# Patient Record
Sex: MALE | Race: WHITE
[De-identification: names, ages, dates, MRNs, and addresses within clinical notes are randomized per-mention and may not be internally consistent; named-entity substitution may affect disease eponyms.]

---

## 2017-03-30 ENCOUNTER — HOSPITAL ENCOUNTER (EMERGENCY)
Dept: HOSPITAL 53 - M ED | Age: 47
Discharge: HOME | End: 2017-03-30
Payer: COMMERCIAL

## 2017-03-30 VITALS — BODY MASS INDEX: 35.55 KG/M2 | HEIGHT: 69 IN | WEIGHT: 240 LBS

## 2017-03-30 VITALS — DIASTOLIC BLOOD PRESSURE: 88 MMHG | SYSTOLIC BLOOD PRESSURE: 145 MMHG

## 2017-03-30 DIAGNOSIS — G47.30: ICD-10-CM

## 2017-03-30 DIAGNOSIS — Z88.5: ICD-10-CM

## 2017-03-30 DIAGNOSIS — Z79.899: ICD-10-CM

## 2017-03-30 DIAGNOSIS — Z98.84: ICD-10-CM

## 2017-03-30 DIAGNOSIS — Z88.8: ICD-10-CM

## 2017-03-30 DIAGNOSIS — J06.9: Primary | ICD-10-CM

## 2017-03-30 DIAGNOSIS — I10: ICD-10-CM

## 2017-03-30 DIAGNOSIS — E03.9: ICD-10-CM

## 2017-03-31 NOTE — REP
Clinical:  Cough.

 

Technique:  PA and lateral.

 

Comparison:  10/20/2016.

 

Findings:

Evaluation is somewhat limited by poor inspiratory effort.  There is evidence to

suggest right lower lobe infiltrate/atelectasis.  No obvious effusion.  No

pneumothorax.  Mediastinum and cardiac silhouette normal.  Skeletal structures

intact.

 

Impression:

Right lower lobe infiltrate/atelectasis.

 

 

Signed by

Zachary Sinclair MD 03/31/2017 07:58 A

## 2017-03-31 NOTE — ED PDOC
Post-Departure Follow-Up


formal cxr noted. spoke w ed charge RN. chart reviewed. Tmax 101 at home w 

cough for 1 wk to rx. levaquin 750 mg qd. Aj luna will reach out to pt. and 

review. mlg Lundborg-Gray,Maja MD Mar 31, 2017 11:56

## 2017-08-26 ENCOUNTER — HOSPITAL ENCOUNTER (INPATIENT)
Dept: HOSPITAL 53 - M ED | Age: 47
LOS: 5 days | Discharge: HOME | DRG: 751 | End: 2017-08-31
Attending: STUDENT IN AN ORGANIZED HEALTH CARE EDUCATION/TRAINING PROGRAM | Admitting: PSYCHIATRY & NEUROLOGY
Payer: COMMERCIAL

## 2017-08-26 VITALS — WEIGHT: 204.15 LBS | BODY MASS INDEX: 30.24 KG/M2 | HEIGHT: 69 IN

## 2017-08-26 VITALS — DIASTOLIC BLOOD PRESSURE: 96 MMHG | SYSTOLIC BLOOD PRESSURE: 138 MMHG

## 2017-08-26 VITALS — SYSTOLIC BLOOD PRESSURE: 166 MMHG | DIASTOLIC BLOOD PRESSURE: 116 MMHG

## 2017-08-26 VITALS — SYSTOLIC BLOOD PRESSURE: 188 MMHG | DIASTOLIC BLOOD PRESSURE: 120 MMHG

## 2017-08-26 VITALS — DIASTOLIC BLOOD PRESSURE: 90 MMHG | SYSTOLIC BLOOD PRESSURE: 139 MMHG

## 2017-08-26 DIAGNOSIS — Z88.8: ICD-10-CM

## 2017-08-26 DIAGNOSIS — I10: ICD-10-CM

## 2017-08-26 DIAGNOSIS — F31.81: ICD-10-CM

## 2017-08-26 DIAGNOSIS — R45.851: ICD-10-CM

## 2017-08-26 DIAGNOSIS — F90.9: ICD-10-CM

## 2017-08-26 DIAGNOSIS — F33.2: Primary | ICD-10-CM

## 2017-08-26 DIAGNOSIS — E87.6: ICD-10-CM

## 2017-08-26 DIAGNOSIS — E03.9: ICD-10-CM

## 2017-08-26 DIAGNOSIS — Z91.19: ICD-10-CM

## 2017-08-26 DIAGNOSIS — Z79.899: ICD-10-CM

## 2017-08-26 DIAGNOSIS — F70: ICD-10-CM

## 2017-08-26 LAB
ALBUMIN SERPL BCG-MCNC: 4.7 GM/DL (ref 3.2–5.2)
ALBUMIN/GLOB SERPL: 1.47 {RATIO} (ref 1–1.93)
ALP SERPL-CCNC: 92 U/L (ref 45–117)
ALT SERPL W P-5'-P-CCNC: 17 U/L (ref 12–78)
ANION GAP SERPL CALC-SCNC: 12 MEQ/L (ref 8–16)
AST SERPL-CCNC: 19 U/L (ref 15–37)
BILIRUB CONJ SERPL-MCNC: 0.5 MG/DL (ref 0–0.2)
BILIRUB SERPL-MCNC: 2 MG/DL (ref 0.2–1)
BUN SERPL-MCNC: 14 MG/DL (ref 7–18)
CALCIUM SERPL-MCNC: 9.3 MG/DL (ref 8.5–10.1)
CHLORIDE SERPL-SCNC: 104 MEQ/L (ref 98–107)
CO2 SERPL-SCNC: 27 MEQ/L (ref 21–32)
CREAT SERPL-MCNC: 1.17 MG/DL (ref 0.7–1.3)
ERYTHROCYTE [DISTWIDTH] IN BLOOD BY AUTOMATED COUNT: 15.1 % (ref 11.5–14.5)
GFR SERPL CREATININE-BSD FRML MDRD: > 60 ML/MIN/{1.73_M2} (ref 60–?)
GLUCOSE SERPL-MCNC: 105 MG/DL (ref 70–105)
MCH RBC QN AUTO: 28 PG (ref 27–33)
MCHC RBC AUTO-ENTMCNC: 33.8 G/DL (ref 32–36.5)
MCV RBC AUTO: 82.7 FL (ref 80–96)
METHADONE UR QL SCN: NEGATIVE
PLATELET # BLD AUTO: 275 K/MM3 (ref 150–450)
POTASSIUM SERPL-SCNC: 3.4 MEQ/L (ref 3.5–5.1)
PROT SERPL-MCNC: 7.9 GM/DL (ref 6.4–8.2)
SODIUM SERPL-SCNC: 143 MEQ/L (ref 136–145)
T4 FREE SERPL-MCNC: 0.79 NG/DL (ref 0.76–1.46)
WBC # BLD AUTO: 6.5 K/MM3 (ref 4–10)

## 2017-08-26 RX ADMIN — LABETALOL HYDROCHLORIDE SCH MG: 100 TABLET, FILM COATED ORAL at 21:30

## 2017-08-26 RX ADMIN — RISPERIDONE SCH MG: 0.5 TABLET ORAL at 21:29

## 2017-08-26 RX ADMIN — HYDRALAZINE HYDROCHLORIDE SCH MG: 25 TABLET, FILM COATED ORAL at 21:29

## 2017-08-26 RX ADMIN — LEVOTHYROXINE SODIUM SCH MCG: 100 TABLET ORAL at 19:14

## 2017-08-26 NOTE — CR.PDOC
[facility] Consultation


Consultation


DATE OF CONSULTATION: Aug 26, 2017 at 05:28





REFERRING PROVIDER:  Rakesh Foy M.D.





ATTENDING PHYSICIAN: Dr. Menon 





REASON FOR CONSULTATION/CHIEF COMPLAINT: . Hypothyroidism





HISTORY OF PRESENT ILLNESS: . 





46-year-old male with past medical history of hypertension, hypothyroidism, and 

depression presented to the ER with a chief complaint of symptoms of depression 

and suicidal ideations. The patient states that he has been having thoughts of 

hurting himself because he is under stress from financial concerns. The 

hospitalist team was consulted due to the patient having an abnormal TSH level. 

At this time, the patient states that he has not taken any of his blood 

pressure medications or his levothyroxine for the past 8+ months. He reports 

that the aforementioned stressors were on his mind, and he stopped taking his 

medications because he didn't think they were helping. The patient denies any 

complaints at this time of fevers, chills, headaches, chest pain, palpitations, 

shortness of breath, abdominal pain, or any nausea/vomiting/diarrhea, weight 

gain/loss, or any other acute complaints.





ALLERGIES: Please see below.





HOME MEDICATIONS: Please see below.





PAST MEDICAL HISTORY:


1. . As noted above





PAST SURGICAL HISTORY: 


1. Gastric bypass





FAMILY HISTORY:


Noncontributory 





SOCIAL HISTORY:


Occasionally has an alcoholic beverage. Denies any tobacco use or any illicit 

drug use. Currently on disability. He is able to ambulate without any assistive 

devices. Lives with his son was also on disability.





REVIEW OF SYSTEMS:


10 point review of systems negative unless otherwise specified in HPI.





PHYSICAL EXAMINATION:


VITAL SIGNS: Please see below.


GENERAL APPEARANCE: . Awake, alert, in no acute distress


HEENT: . Normocephalic, atraumatic


RESPIRATORY: . Clear to auscultation bilaterally


CARDIOVASCULAR: . Normal rate, normal S1, S2


ABDOMEN: . Soft, nontender, nondistended


EXTREMITIES: . No tenderness or swelling





LABORATORY DATA: Please see below.





ASSESSMENT/PLAN: 


1. . Abnormal TSH level 2/2 Non-Adherence to Medical Therapy


The patient has not been taking his levothyroxine 200 mcg for the past 8+ months


The patient is not displaying any signs or symptoms of myxedema coma


We will restart the patient's levothyroxine at 200 g daily


Repeat TFT's in 6-8 weeks to assess response





2. . Hypertension


Elevated B/P in the ER (170s/90s) 2/2 Non-Adherence to Medical Therapy


Patient restarted on Labetalol and Hydralazine with holding parameters





3. Depression with Suicidal Ideations


Mgmt as per Psychiatry





Thank you for allowing us to participate in the care of this patient. Please do 

not hesitate to call with any questions or concerns.





Vital Signs/I&O





Vital Signs








  Date Time  Temp Pulse Resp B/P (MAP) Pulse Ox O2 Delivery O2 Flow Rate FiO2


 


8/26/17 12:38 98.0 57 18 188/120 (142) 99 Room Air  











Laboratory Data


Labs 24H


Laboratory Tests 2


8/26/17 05:53: 


Anion Gap 12, Glomerular Filtration Rate > 60.0, Calcium Level 9.3, Aspartate 

Amino Transf (AST/SGOT) 19, Alanine Aminotransferase (ALT/SGPT) 17, Alkaline 

Phosphatase 92, Total Bilirubin 2.0H, Direct Bilirubin 0.5H, Total Protein 7.9, 

Albumin 4.7, Albumin/Globulin Ratio 1.47, Thyroid Stimulating Hormone (TSH) 

89.900H, Salicylates Level < 1.7L, Urine Amphetamines Screen NEGATIVE, Urine 

Benzodiazepines Screen NEGATIVE, Urine Opiates Screen NEGATIVE, Urine Methadone 

Screen NEGATIVE, Acetaminophen Level < 2.0L, Urine Barbiturates Screen NEGATIVE

, Urine Phencyclidine Screen NEGATIVE, Urine Cocaine Metabolite Screen NEGATIVE

, Urine Cannabinoids Screen NEGATIVE, Ethyl Alcohol Level < 0.003


CBC/BMP


Laboratory Tests


8/26/17 05:53








Red Blood Count 5.19, Mean Corpuscular Volume 82.7, Mean Corpuscular Hemoglobin 

28.0, Mean Corpuscular Hemoglobin Concent 33.8, Red Cell Distribution Width 

15.1 H





Allergies


Coded Allergies:  


     Prednisone (Unverified  Allergy, Unknown, aphylaxis, 12/26/15)


     Tramadol (Unverified  Allergy, Unknown, aphylaxis, 12/26/15)


     Trazodone (Verified  Allergy, Unknown, 3/18/15)





Home Medications


Scheduled


Hydralazine HCl (Hydralazine HCl) 10 Mg Tab, 10 MG PO TID, (Reported)


Labetalol HCl (Labetalol HCl) 100 Mg Tab, 100 MG PO BID, (Reported)


Levothyroxine Sodium (Synthroid) 200 Mcg Tab, 200 MCG PO DAILY, (Reported)


Sertraline HCl (Sertraline HCl) 100 Mg Tab, 100 MG PO QHS, (Reported)











RAKESH FOY MD Aug 26, 2017 16:12

## 2017-08-26 NOTE — MHHPEPDOC
[facility] History & Physical


History and Physical


DATE OF ADMISSION: Aug 26, 2017 at 06:57





LEGAL STATUS AT ADMISSION: 9.39





CHIEF COMPLAINT: Patient came to the ED saying he wanted to OD on all his 

medications because he is at "the end of his rope" since he has financial 

problems.


 


HISTORY OF THE PRESENT ILLNESS: Patient is a 46-year-old male, who reports he 

has financial problems due overspending. He states he has about $6,000.00 in 

debts and he is not employed, he receives SSI and repeats over and over again," 

I don't know why I do this." He feels overwhelmed now that he is getting calls 

demanding money. He and his son live together and share expenses. His son is 

also on disability for Mild intellectual functioning, bipolar disorder and 

ADHD. He is very concerned about his wife's health who has lung cancer.


 


PSYCHIATRIC REVIEW OF SYSTEMS:


Affective: Hopeless, helpless, frustrated


Anxiety: Very high


Trauma: Denies


Psychosis: He's not responding to internal stimuli, denies psychosis


Personally: Needs further assessment





PAST PSYCHIATRIC HISTORY:


Prior Psychiatric Disorder: Patient says he has been taking Zoloft, and his 

doctor upgraded to 200 mgs. PO QD, but has not been compliant with it.


Outpatient Treatment: Gets medication from his family Doctor, Dr. Danny Figueroa. 

He used to go to [facility]. He doesn't remember what 

medications he was on. He started feeling well and then, he stopped taking them


Suicidal/Self injurious: He has suicidal ideation


Psychotropic Medication History: He has taken other psychiatric medications 

whose name he doesn't remember but he knows he takes Zoloft. He "thinks" he 

took Ritalin years ago for ADHD. 





ALLERGIES: Please see below.





FAMILY PSYCHIATRIC HISTORY: He has a 23 year son who has been diagnosed with 

Bipolar Disorder, ADHD and Mild Intellectual Disability. He is on disability 

for those reason. One of his brothers is "mentally handicapped, he's got a lot 

of emotional problems and stuff". he says he doesn't see his brother very often

, he doesn't know his brother diagnosis and he doesn't know if he takes 

medications.       





SOCIAL HISTORY:


Early Relations/development: Grew up in [place], his parents were from [place] 

[place]. He says he had problems learning, had 'a bad behavior problem", he says 

he had angry outbursts. He says he got school suspensions frequently but when 

he was 14, he never went back. He can't remember if he was expulsed from school 

or he was suspended and never went back.


Sibling order: He has two older brothers and one older sister. He is the fourth 

one. He has 4 younger siblings. One of them is , he was older than the 

patient. His  brother  of a massive heart attack at the age of 40.


Paternal relationships: Both parents are . "They  of medical stuff 

complications"


Education: He didn't graduate from High School. He has a hard time with reading 

and writing since he was a child. He has been diagnosed with a learning 

disability. He only can write his name, he can copy a few things but other than 

that, he's limited. He can't read a lot, he can't understand a lot.


Occupational: Unemployed. 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


Legal: Denies


Martial: He  is  but he is still . He helps to take care of his 

wife, who has lung cancer and has breathing difficulties.He has two sons and a 

daughter. Sons are 24 and 23. Daughter is 28 years old.


Economic: Receives disability check because he has a learning disability, he can

't read or write. He has spinal stenosis.


Supports: He feels his son is supportive, he feels his siblings are supportive.


Abuse/trauma: Denies


 


SUBSTANCE ABUSE HISTORY: Has used alcohol occasionally although he says years 

ago he used to drink "quite a bit but not anymore". doesn't smoke cigarettes. 

he used marijuana years ago, on and off but not anymore. Denies using cocaine 

or crack. Denies using heroine or other opioids.





PAST MEDICAL/SURGICAL HISTORY:


1. Spinal stenosis


2. Hypertension


3. Hypothyroidism





VITAL SIGNS: See below





MENTAL STATUS EXAMINATION:


General appearance: Patient is a 46-year old male, who is alert, disheveled, 

dressed in hospital clothes with fair eye contact.


Speech: coherent.


Thought processes: Intact.


Thought content: Coherent.


Abstract reasoning and computation: Fair.


Description of associations: Good.


Description of abnormal or psychotic thoughts: Denies thought delusions, denies 

auditory and visual hallucinations, says he gets obsessive about buying things.


Judgment: Poor


Insight: Poor.


Orientation: Oriented x 3.


Recent and remote memory: Limited.


Attention span and concentration: Fair.


Fund of knowledge: Fair.


Mood: "Irritable/sad."


Affect: Labile





DIAGNOSES:


1. Major Depressive Disorder, recurrent, severe


2. R/O Mild intellectual functioning 


3. ADHD by history


4. Learning Disabilities by history





ASSESSMENT: Maeve seems to be low functioning, he is impulsive, depressed, has 

neglected his medical problems, has not been taking his medications. His TSH is 

88.9. Dr. Laboy was made aware of this and designated Dr. Basurto to evaluate 

the patient while he still was at the ED. Dr. Basurto said his TSH was very high 

because he had stopped taking his medications, but he could ve started on them 

again. i consider is necessary to do a CT to r/o other possible complication ( 

mass, tumor???)





PROBLEM LIST:


1. Risk for suicide


2. Risk for self injury.


3. Depression


4. Self care deficit


5. Poor impulse control


6. Ineffective coping


7. Noncompliance


8. Anxiety


 


INITIAL TREATMENT PLAN:


1. Patient was admitted on a 9. 


2. Complete history was obtained.


3. With patients permission, family will be contacted and database will be 

expanded. 


4. Patients medication regimen will be reviewed and changed accordingly. 


5. Patient will be provided with protected environment. 


6. Patient will be treated with individual, group, and milieu therapies. 


7. Patient will receive supportive psych-education.


8. Discharge planning will commence immediately.


9. Outpatient follow-up treatment will be strongly recommended.


10. The initial treatment plan will focus initially on:


* Depression.


* Risk for suicide.


* Substance abuse.





ESTIMATED LENGTH OF STAY: 5-7 DAYS.





TIME SPENT COUNSELING AND COORDINATING INITIAL CARE: 60 minutes.





Laboratory Data


24H Labs


Laboratory Tests 2


17 05:53: 


Anion Gap 12, Glomerular Filtration Rate > 60.0, Calcium Level 9.3, Aspartate 

Amino Transf (AST/SGOT) 19, Alanine Aminotransferase (ALT/SGPT) 17, Alkaline 

Phosphatase 92, Total Bilirubin 2.0H, Direct Bilirubin 0.5H, Total Protein 7.9, 

Albumin 4.7, Albumin/Globulin Ratio 1.47, Thyroid Stimulating Hormone (TSH) 

89.900H, Salicylates Level < 1.7L, Urine Amphetamines Screen NEGATIVE, Urine 

Benzodiazepines Screen NEGATIVE, Urine Opiates Screen NEGATIVE, Urine Methadone 

Screen NEGATIVE, Acetaminophen Level < 2.0L, Urine Barbiturates Screen NEGATIVE

, Urine Phencyclidine Screen NEGATIVE, Urine Cocaine Metabolite Screen NEGATIVE

, Urine Cannabinoids Screen NEGATIVE, Ethyl Alcohol Level < 0.003


CBC/BMP


Laboratory Tests


17 05:53








Red Blood Count 5.19, Mean Corpuscular Volume 82.7, Mean Corpuscular Hemoglobin 

28.0, Mean Corpuscular Hemoglobin Concent 33.8, Red Cell Distribution Width 

15.1 H





Medications


Scheduled


Hydralazine HCl (Hydralazine HCl) 10 Mg Tab, 10 MG PO TID, (Reported)


Labetalol HCl (Labetalol HCl) 100 Mg Tab, 100 MG PO BID, (Reported)


Levothyroxine Sodium (Synthroid) 200 Mcg Tab, 200 MCG PO DAILY, (Reported)


Sertraline HCl (Sertraline HCl) 100 Mg Tab, 100 MG PO QHS, (Reported)





Allergies


Coded Allergies:  


     Prednisone (Unverified  Allergy, Unknown, aphylaxis, 12/26/15)


     Tramadol (Unverified  Allergy, Unknown, aphylaxis, 12/26/15)


     Trazodone (Verified  Allergy, Unknown, 3/18/15)











BENY DESAI MD Aug 26, 2017 15:49

## 2017-08-27 VITALS — DIASTOLIC BLOOD PRESSURE: 77 MMHG | SYSTOLIC BLOOD PRESSURE: 119 MMHG

## 2017-08-27 VITALS — DIASTOLIC BLOOD PRESSURE: 69 MMHG | SYSTOLIC BLOOD PRESSURE: 135 MMHG

## 2017-08-27 LAB
ALBUMIN SERPL BCG-MCNC: 4.4 GM/DL (ref 3.2–5.2)
ALBUMIN/GLOB SERPL: 1.42 {RATIO} (ref 1–1.93)
ALP SERPL-CCNC: 86 U/L (ref 45–117)
ALT SERPL W P-5'-P-CCNC: 15 U/L (ref 12–78)
ANION GAP SERPL CALC-SCNC: 10 MEQ/L (ref 8–16)
AST SERPL-CCNC: 15 U/L (ref 15–37)
BILIRUB SERPL-MCNC: 1.2 MG/DL (ref 0.2–1)
BUN SERPL-MCNC: 22 MG/DL (ref 7–18)
CALCIUM SERPL-MCNC: 9.1 MG/DL (ref 8.5–10.1)
CHLORIDE SERPL-SCNC: 107 MEQ/L (ref 98–107)
CO2 SERPL-SCNC: 28 MEQ/L (ref 21–32)
CREAT SERPL-MCNC: 1.2 MG/DL (ref 0.7–1.3)
GFR SERPL CREATININE-BSD FRML MDRD: > 60 ML/MIN/{1.73_M2} (ref 60–?)
GLUCOSE SERPL-MCNC: 91 MG/DL (ref 70–105)
POTASSIUM SERPL-SCNC: 4.1 MEQ/L (ref 3.5–5.1)
PROT SERPL-MCNC: 7.5 GM/DL (ref 6.4–8.2)
SODIUM SERPL-SCNC: 145 MEQ/L (ref 136–145)

## 2017-08-27 RX ADMIN — RISPERIDONE SCH MG: 0.5 TABLET ORAL at 21:13

## 2017-08-27 RX ADMIN — HYDRALAZINE HYDROCHLORIDE SCH MG: 25 TABLET, FILM COATED ORAL at 21:13

## 2017-08-27 RX ADMIN — LEVOTHYROXINE SODIUM SCH MCG: 100 TABLET ORAL at 05:48

## 2017-08-27 RX ADMIN — ACETAMINOPHEN PRN MG: 325 TABLET ORAL at 08:48

## 2017-08-27 RX ADMIN — ACETAMINOPHEN PRN MG: 325 TABLET ORAL at 03:46

## 2017-08-27 RX ADMIN — HYDRALAZINE HYDROCHLORIDE SCH MG: 25 TABLET, FILM COATED ORAL at 15:09

## 2017-08-27 RX ADMIN — HYDRALAZINE HYDROCHLORIDE SCH MG: 25 TABLET, FILM COATED ORAL at 08:48

## 2017-08-27 RX ADMIN — RISPERIDONE SCH MG: 0.5 TABLET ORAL at 08:48

## 2017-08-27 RX ADMIN — LABETALOL HYDROCHLORIDE SCH MG: 100 TABLET, FILM COATED ORAL at 21:13

## 2017-08-27 RX ADMIN — ACETAMINOPHEN PRN MG: 325 TABLET ORAL at 21:15

## 2017-08-27 RX ADMIN — SERTRALINE HYDROCHLORIDE SCH MG: 100 TABLET ORAL at 08:47

## 2017-08-27 RX ADMIN — LABETALOL HYDROCHLORIDE SCH MG: 100 TABLET, FILM COATED ORAL at 08:47

## 2017-08-27 NOTE — MHIPNPDOC
[facility] Progress Note


Progress Note


DATE OF SERVICE: 8/27/17





INTERVAL HISTORY:





Medication Side effects: Patient denies medication side effects


Behavior: Patient has been attending groups, has participated in them, has not 

been aggressive or violent, has been respectful of staff and peers


Group Attendance: Good


Psychiatric Symptom change: Patient is still anxious but a little bit less than 

yesterday. Says he woke up around 3 a.m. and had a little bit of a hard time 

going back to sleep.





VITAL SIGNS: See below.





NEW TEST RESULTS: See below





CURRENT MEDICATIONS: See below.





MENTAL STATUS EXAMINATION:





General: Alert, cooperative, dressed in hospital clothes, with good eye contact

, disheveled


Speech: Spontaneous, fluid, sometimes tangential


Thought processes: Coherent


Thought content: Anxious thoughts about his financial problems, but those 

thoughts are not as frequen as they were yesterday.


Abstract reasoning, and computation: Limited


Description of associations: Fair


Description of abnormal or psychotic thoughts: Denies auditory and visual 

hallucinations, denies thought delusions, denies suicidal and homicidal ideation


Judgment: Poor


Insight: Limited


Orientation: Oriented 3


Recent and remote memory: Patient has memory from events of his past, his 

recent memory is good.


Attention span and concentration: Fair


Fund of knowledge: Fair


Mood: Anxious


Affect: Congruent to mood





DIAGNOSES:


1. Major depressive disorder, recurrent, severe


2. Rule out bipolar 2 disorder


3. Rule out mild intellectual disability


4. ADHD by history


 


ASSESSMENT: patient says he is feeling less anxious and less depressed today, 

he feels medications are working. Denied medication side effects. He seems more 

calmed, has not been tearful today.





MANAGEMENT PLAN: Will continue with the same treatment plan, the same 

medications, group and individual psychotherapy. Patient's worries are mostly 

secondary to overspending and having poor impulse control. He says that he 

becomes obsessed about the things that he wants to buy, cannot get them out of 

his mind until he goes and buys them. Maybe through caseworkers he could get in 

touch with some credits counseling agents that will help him with his financial 

problems. Patient's judgment and insight are poor.





Disposition: Patient is to remain at the inpatient mental health unit for 

further stabilization and medication adjustments. He will need psychotherapy to 

address other issues, to learn coping skills to control his impulsivity





TIME SPENT: 30 minutes.





Vital Signs





Vital Signs








  Date Time  Temp Pulse Resp B/P (MAP) Pulse Ox O2 Delivery O2 Flow Rate FiO2


 


8/27/17 08:48    119/77    


 


8/27/17 08:47  61      


 


8/27/17 06:42 99.0  20     


 


8/26/17 12:38     99 Room Air  











Laboratory Data


24H Labs


Laboratory Tests 2


8/27/17 07:37: 


Anion Gap 10, Glomerular Filtration Rate > 60.0, Blood Urea Nitrogen 22#H, 

Creatinine 1.20, Sodium Level 145, Potassium Level 4.1#, Chloride Level 107, 

Carbon Dioxide Level 28, Calcium Level 9.1, Aspartate Amino Transf (AST/SGOT) 15

, Alanine Aminotransferase (ALT/SGPT) 15, Alkaline Phosphatase 86, Total 

Bilirubin 1.2H, Total Protein 7.5, Albumin 4.4, Albumin/Globulin Ratio 1.42


CBC/BMP


Laboratory Tests


8/27/17 07:37








Calcium Level 9.1, Aspartate Amino Transf (AST/SGOT) 15, Alanine 

Aminotransferase (ALT/SGPT) 15, Alkaline Phosphatase 86, Total Bilirubin 1.2 H, 

Total Protein 7.5, Albumin 4.4





Current Medications





Current Medications


Acetaminophen (Tylenol Tab) 650 mg Q6HP  PRN PO HEADACHE or DISCOMFORT Last 

administered on 8/27/17at 08:48;  Start 8/26/17 at 07:00;  Stop 9/25/17 at 06:59


Al Hydrox/Mg Hydrox/Simethicone (Mylanta) 30 ml Q4HP  PRN PO HEARTBURN/

INDIGESTION;  Start 8/26/17 at 07:00;  Stop 9/25/17 at 06:59


Home Med (Med Rec Complete!)  ASDIRECTED XX ;  Start 8/26/17 at 06:30;  Stop 8/ 26/17 at 06:30;  Status DC


Hydralazine HCl (Apresoline) 10 mg TID PO  Last administered on 8/26/17at 16:36

;  Start 8/26/17 at 16:00;  Stop 8/26/17 at 16:41;  Status DC


Hydralazine HCl (Apresoline) 25 mg TID PO  Last administered on 8/27/17at 08:48

;  Start 8/26/17 at 21:00;  Stop 9/25/17 at 20:59


Hydroxyzine HCl (Atarax) 50 mg Q4HP  PRN PO ITCHING Last administered on 8/26/ 17at 16:36;  Start 8/26/17 at 16:00;  Stop 9/25/17 at 15:59


Labetalol HCl (Normodyne, Trandate) 100 mg BID PO  Last administered on 8/27/ 17at 08:47;  Start 8/26/17 at 21:00;  Stop 9/25/17 at 20:59


Levothyroxine Sodium (Synthroid) 200 mcg DAILY@0600 PO  Last administered on 8/ 27/17at 05:48;  Start 8/26/17 at 14:00;  Stop 9/25/17 at 13:59


Magnesium Hydroxide (Milk Of Magnesia) 30 ml DAILYPRN  PRN PO CONSTIPATION;  

Start 8/26/17 at 07:00;  Stop 9/25/17 at 06:59


Risperidone (RisperDAL) 0.5 mg BID PO  Last administered on 8/27/17at 08:48;  

Start 8/26/17 at 21:00;  Stop 9/25/17 at 20:59


Sertraline HCl (Zoloft) 50 mg DAILY PO  Last administered on 8/26/17at 16:36;  

Start 8/26/17 at 09:00;  Stop 8/26/17 at 23:59;  Status DC


Sertraline HCl (Zoloft) 100 mg DAILY PO  Last administered on 8/27/17at 08:47;  

Start 8/27/17 at 09:00;  Stop 9/26/17 at 08:59





Allergies


Coded Allergies:  


     Prednisone (Unverified  Allergy, Unknown, aphylaxis, 12/26/15)


     Tramadol (Unverified  Allergy, Unknown, aphylaxis, 12/26/15)


     Trazodone (Verified  Allergy, Unknown, 3/18/15)











BENY DESAI MD Aug 27, 2017 10:42

## 2017-08-28 VITALS — SYSTOLIC BLOOD PRESSURE: 118 MMHG | DIASTOLIC BLOOD PRESSURE: 71 MMHG

## 2017-08-28 VITALS — SYSTOLIC BLOOD PRESSURE: 138 MMHG | DIASTOLIC BLOOD PRESSURE: 80 MMHG

## 2017-08-28 VITALS — DIASTOLIC BLOOD PRESSURE: 100 MMHG | SYSTOLIC BLOOD PRESSURE: 150 MMHG

## 2017-08-28 RX ADMIN — RISPERIDONE SCH MG: 0.5 TABLET ORAL at 20:35

## 2017-08-28 RX ADMIN — ACETAMINOPHEN PRN MG: 325 TABLET ORAL at 16:42

## 2017-08-28 RX ADMIN — HYDRALAZINE HYDROCHLORIDE SCH MG: 25 TABLET, FILM COATED ORAL at 08:14

## 2017-08-28 RX ADMIN — DIVALPROEX SODIUM SCH MG: 250 TABLET, DELAYED RELEASE ORAL at 11:55

## 2017-08-28 RX ADMIN — HYDRALAZINE HYDROCHLORIDE SCH MG: 25 TABLET, FILM COATED ORAL at 20:36

## 2017-08-28 RX ADMIN — LABETALOL HYDROCHLORIDE SCH MG: 100 TABLET, FILM COATED ORAL at 20:36

## 2017-08-28 RX ADMIN — RISPERIDONE SCH MG: 0.5 TABLET ORAL at 08:14

## 2017-08-28 RX ADMIN — LEVOTHYROXINE SODIUM SCH MCG: 100 TABLET ORAL at 06:17

## 2017-08-28 RX ADMIN — HYDRALAZINE HYDROCHLORIDE SCH MG: 25 TABLET, FILM COATED ORAL at 16:40

## 2017-08-28 RX ADMIN — SERTRALINE HYDROCHLORIDE SCH MG: 100 TABLET ORAL at 08:14

## 2017-08-28 RX ADMIN — DIVALPROEX SODIUM SCH MG: 250 TABLET, DELAYED RELEASE ORAL at 20:37

## 2017-08-28 RX ADMIN — LABETALOL HYDROCHLORIDE SCH MG: 100 TABLET, FILM COATED ORAL at 08:15

## 2017-08-28 NOTE — MHIPNPDOC
[facility] Progress Note


Progress Note


DATE OF SERVICE: 8/28/17





HISTORY: day 3 of admission. admitted on 9.39 Involuntary status.


 Patient came to the ED saying he wanted to OD on all his medications because 

he is at "the end of his rope" since he has financial problems.


 


VITAL SIGNS: See below.





NEW TEST RESULTS: 





CURRENT MEDICATIONS: See below.





MENTAL STATUS EXAMINATION:


Patient is a 46-year old male, who is disheveled, distraught, dark hair, 

glasses and short in stature.


Speech: Is spontaneous


Language skills are adequate


Thought processes including: cuts himself off mid-sentence due to distress. 

tangential and circumstantial.


Thought content: finances, son, appropriate. Abstract reasoning, and computation

: fair. Description of associations: good.


Description of abnormal or psychotic thoughts: pt denies persistent thoughts of 

suicide. States this has stopped and he would like to go home. Pt is over-

wrought with worry.


Judgment: poor


Insight: very limited.


Orientation: well oriented to place, time, surroundings and person.


Recent and remote memory: adequate.


Attention span and concentration: poor, h/o ADHD 


Fund of knowledge: Limited/Impaired, poor decision making.


Mood: depressed. Affect: congruent.





DIAGNOSES:


1. Major depressive disorder, recurrent, severe


2. Rule out bipolar 2 disorder


3. Rule out mild intellectual disability


4. ADHD by history


 


ASSESSMENT:pt has spent $6000 on furniture, a scooter for his son, a new car 

and other expenses since May. his income is only $758/month. He pays $297 in 

rent. He shares rent with his son currently. He is also his son's payee. Son 

receives SSDI.  He admits to having poor impulse control when using credit 

cards. He is behind on his payments (not car and scooter). Car is insured and 

this costs him $188 a month. Creditors are calling him all the time and he is 

getting notices in the mail. He does not know how to proceed.  He has been 

admitted previously for depression. he is non compliant with treatment 

following discharge.





MANAGEMENT PLAN: we will contact credit counseling to see if they have 

suggestions for pat. Perhaps they can visit him here and give him some 

suggestions. He needs hands on help with getting his affairs in order. We will 

likely need to contact his family and get a few people involved that can help 

him. He won't be able to manage this on his own. He has 5 sisters in the area. 

? case management - would this be of any help to him. He would likely benefit 

from having a payee himself and not being son's payee. He has started on 

Depakote for assistance with impulse control. Thyroid level very high. 

Levothyroxine ordered. pt was non-complaint with antihypertensive med as an out 

patient too. He has endangered his health due to his poor judgment and insight 

and possibly his poor understanding of his medical conditions. Will  pt 

on these things.





TIME SPENT: 25 minutes.





Vital Signs





Vital Signs








  Date Time  Temp Pulse Resp B/P (MAP) Pulse Ox O2 Delivery O2 Flow Rate FiO2


 


8/28/17 08:15  81  118/71    


 


8/28/17 06:35 99.0  20   Room Air  


 


8/26/17 12:38     99   











Current Medications





Current Medications


Acetaminophen (Tylenol Tab) 650 mg Q6HP  PRN PO HEADACHE or DISCOMFORT Last 

administered on 8/27/17at 21:15;  Start 8/26/17 at 07:00;  Stop 9/25/17 at 06:59


Al Hydrox/Mg Hydrox/Simethicone (Mylanta) 30 ml Q4HP  PRN PO HEARTBURN/

INDIGESTION;  Start 8/26/17 at 07:00;  Stop 9/25/17 at 06:59


Divalproex Sodium (Depakote) 250 mg BID PO  Last administered on 8/28/17at 11:55

;  Start 8/28/17 at 09:00;  Stop 9/27/17 at 08:59


Home Med (Med Rec Complete!)  ASDIRECTED XX ;  Start 8/26/17 at 06:30;  Stop 8/ 26/17 at 06:30;  Status DC


Hydralazine HCl (Apresoline) 10 mg TID PO  Last administered on 8/26/17at 16:36

;  Start 8/26/17 at 16:00;  Stop 8/26/17 at 16:41;  Status DC


Hydralazine HCl (Apresoline) 25 mg TID PO  Last administered on 8/28/17at 08:14

;  Start 8/26/17 at 21:00;  Stop 9/25/17 at 20:59


Hydroxyzine HCl (Atarax) 50 mg Q4HP  PRN PO ITCHING Last administered on 8/27/ 17at 21:13;  Start 8/26/17 at 16:00;  Stop 9/25/17 at 15:59


Labetalol HCl (Normodyne, Trandate) 100 mg BID PO  Last administered on 8/28/ 17at 08:15;  Start 8/26/17 at 21:00;  Stop 9/25/17 at 20:59


Levothyroxine Sodium (Synthroid) 200 mcg DAILY@0600 PO  Last administered on 8/ 28/17at 06:17;  Start 8/26/17 at 14:00;  Stop 9/25/17 at 13:59


Magnesium Hydroxide (Milk Of Magnesia) 30 ml DAILYPRN  PRN PO CONSTIPATION;  

Start 8/26/17 at 07:00;  Stop 9/25/17 at 06:59


Risperidone (RisperDAL) 0.5 mg BID PO  Last administered on 8/28/17at 08:14;  

Start 8/26/17 at 21:00;  Stop 9/25/17 at 20:59


Sertraline HCl (Zoloft) 50 mg DAILY PO  Last administered on 8/26/17at 16:36;  

Start 8/26/17 at 09:00;  Stop 8/26/17 at 23:59;  Status DC


Sertraline HCl (Zoloft) 100 mg DAILY PO  Last administered on 8/28/17at 08:14;  

Start 8/27/17 at 09:00;  Stop 9/26/17 at 08:59





Allergies


Coded Allergies:  


     Prednisone (Unverified  Allergy, Unknown, aphylaxis, 12/26/15)


     Tramadol (Unverified  Allergy, Unknown, aphylaxis, 12/26/15)


     Trazodone (Verified  Allergy, Unknown, 3/18/15)











Elisa Pace Aug 28, 2017 14:18

## 2017-08-29 VITALS — SYSTOLIC BLOOD PRESSURE: 135 MMHG | DIASTOLIC BLOOD PRESSURE: 100 MMHG

## 2017-08-29 VITALS — SYSTOLIC BLOOD PRESSURE: 138 MMHG | DIASTOLIC BLOOD PRESSURE: 72 MMHG

## 2017-08-29 RX ADMIN — LEVOTHYROXINE SODIUM SCH MCG: 100 TABLET ORAL at 06:11

## 2017-08-29 RX ADMIN — SERTRALINE HYDROCHLORIDE SCH MG: 100 TABLET ORAL at 08:24

## 2017-08-29 RX ADMIN — RISPERIDONE SCH MG: 0.5 TABLET ORAL at 08:24

## 2017-08-29 RX ADMIN — RISPERIDONE SCH MG: 0.5 TABLET ORAL at 20:57

## 2017-08-29 RX ADMIN — HYDRALAZINE HYDROCHLORIDE SCH MG: 25 TABLET, FILM COATED ORAL at 16:03

## 2017-08-29 RX ADMIN — LABETALOL HYDROCHLORIDE SCH MG: 100 TABLET, FILM COATED ORAL at 08:25

## 2017-08-29 RX ADMIN — DIVALPROEX SODIUM SCH MG: 250 TABLET, DELAYED RELEASE ORAL at 08:24

## 2017-08-29 RX ADMIN — LABETALOL HYDROCHLORIDE SCH MG: 100 TABLET, FILM COATED ORAL at 20:59

## 2017-08-29 RX ADMIN — HYDRALAZINE HYDROCHLORIDE SCH MG: 25 TABLET, FILM COATED ORAL at 08:25

## 2017-08-29 RX ADMIN — ACETAMINOPHEN PRN MG: 325 TABLET ORAL at 11:42

## 2017-08-29 RX ADMIN — HYDRALAZINE HYDROCHLORIDE SCH MG: 25 TABLET, FILM COATED ORAL at 20:59

## 2017-08-29 RX ADMIN — DIVALPROEX SODIUM SCH MG: 250 TABLET, DELAYED RELEASE ORAL at 20:58

## 2017-08-29 NOTE — MHIPNPDOC
[facility] Progress Note


Progress Note


DATE OF SERVICE: 8/29/17





HISTORY: day for of admission for SI.





VITAL SIGNS: See below.





NEW TEST RESULTS: .Consultation


DATE OF CONSULTATION: Aug 26, 2017 at 05:28





REFERRING PROVIDER:  Rakesh Basurto M.D.





ATTENDING PHYSICIAN: Dr. Menon 





REASON FOR CONSULTATION/CHIEF COMPLAINT: . Hypothyroidism





HISTORY OF PRESENT ILLNESS: . 





46-year-old male with past medical history of hypertension, hypothyroidism, and 

depression presented to the ER with a chief complaint of symptoms of depression 

and suicidal ideations. The patient states that he has been having thoughts of 

hurting himself because he is under stress from financial concerns. The 

hospitalist team was consulted due to the patient having an abnormal TSH level. 

At this time, the patient states that he has not taken any of his blood 

pressure medications or his levothyroxine for the past 8+ months. He reports 

that the aforementioned stressors were on his mind, and he stopped taking his 

medications because he didn't think they were helping. The patient denies any 

complaints at this time of fevers, chills, headaches, chest pain, palpitations, 

shortness of breath, abdominal pain, or any nausea/vomiting/diarrhea, weight 

gain/loss, or any other acute complaints.





ALLERGIES: Please see below.





HOME MEDICATIONS: Please see below.





PAST MEDICAL HISTORY:


1. . As noted above





PAST SURGICAL HISTORY: 


1. Gastric bypass





FAMILY HISTORY:


Noncontributory 





SOCIAL HISTORY:


Occasionally has an alcoholic beverage. Denies any tobacco use or any illicit 

drug use. Currently on disability. He is able to ambulate without any assistive 

devices. Lives with his son was also on disability.





REVIEW OF SYSTEMS:


10 point review of systems negative unless otherwise specified in HPI.





PHYSICAL EXAMINATION:


VITAL SIGNS: Please see below.


GENERAL APPEARANCE: . Awake, alert, in no acute distress


HEENT: . Normocephalic, atraumatic


RESPIRATORY: . Clear to auscultation bilaterally


CARDIOVASCULAR: . Normal rate, normal S1, S2


ABDOMEN: . Soft, nontender, nondistended


EXTREMITIES: . No tenderness or swelling





LABORATORY DATA: Please see below.





ASSESSMENT/PLAN: 


1. . Abnormal TSH level 2/2 Non-Adherence to Medical Therapy


The patient has not been taking his levothyroxine 200 mcg for the past 8+ months


The patient is not displaying any signs or symptoms of myxedema coma


We will restart the patient's levothyroxine at 200 g daily


Repeat TFT's in 6-8 weeks to assess response





2. . Hypertension


Elevated B/P in the ER (170s/90s) 2/2 Non-Adherence to Medical Therapy


Patient restarted on Labetalol and Hydralazine with holding parameters





3. Depression with Suicidal Ideations


Mgmt as per Psychiatry





Thank you for allowing us to participate in the care of this patient. Please do 

not hesitate to call with any questions or concerns.





CURRENT MEDICATIONS: See below.





MENTAL STATUS EXAMINATION:


Patient is a 46-year old male, hair is neatly combed, wears glasses and is 

short in stature. Dressed in street clothes, fair eye contact


Speech: Is spontaneous


Language skills are adequate


Thought processes including: cuts himself off mid-sentence due to distress. 

tangential and circumstantial.


Thought content: finances, son, appropriate. Abstract reasoning, and computation

: fair. Description of associations: good.


Description of abnormal or psychotic thoughts: pt denies persistent thoughts of 

suicide. States this has stopped and he would like to go home. Pt is over-

wrought with worry.


Judgment: poor


Insight: very limited.


Orientation: well oriented to place, time, surroundings and person.


Recent and remote memory: adequate.


Attention span and concentration: poor, h/o ADHD 


Fund of knowledge: Limited/Impaired, poor decision making. Easily overwhelmed 

and could be easily intimidated.


Mood: depressed. Affect: congruent.





DIAGNOSES:


1. Major depressive disorder, recurrent, severe


2. Rule out bipolar 2 disorder


3. Rule out mild intellectual disability


4. ADHD by history


 


ASSESSMENT:staff reports that patient's son visited last night. they found his 

behavior lacking. He was rude and demanding. Pt was asked if his son pressures 

him for things and he stated "yes". Pt participated in treatment planning and 

had difficulty processing the information shared with him. He did sign the 

treatment plan as well as several BAMBI so that we could make contact with the 

numerous services or individuals who should be involved with his plan. He 

failed to understand the importance of this and need for this yesterday when  

it was explained to him. Mr. Villatoro is very easily over whelmed with the 

demands of life and finds simple day to day tasks challenging. His cognitive 

skills are below most 48 year olds. He should really have a payee and he should 

not be his son's payee. He is reluctant to give any of this up however.  We 

will discuss this with the family as writer has requested a family meeting 

before discharge. 





Pt reports improved mood today, feeling less depressed. Pt states medication 

have been helpful but it is very early in the process to determine this.





MANAGEMENT PLAN: Pt will be encouraged to take advantage of case management 

services and credit counseling skills that will be made available to him. He 

needs to meet with someone who can contact his creditors and help him establish 

a realistic payment plan. Pt may have to give up his car for a less expensive 

one. He does not drive. His son does the driving. Continue meds, close obs and 

schedule family meeting. a meeting with case mgt and counseling services can be 

held all together if possible. Pt may have visitation on 8/31 during non 

visiting hours by his son who attends college in [place]. Zachary order 

written.





TIME SPENT: 25 minutes.





Vital Signs





Vital Signs








  Date Time  Temp Pulse Resp B/P (MAP) Pulse Ox O2 Delivery O2 Flow Rate FiO2


 


8/29/17 08:25    142/93    


 


8/29/17 08:25  95      


 


8/29/17 06:22 98.3  18     


 


8/28/17 06:35      Room Air  


 


8/26/17 12:38     99   











Current Medications





Current Medications


Acetaminophen (Tylenol Tab) 650 mg Q6HP  PRN PO HEADACHE or DISCOMFORT Last 

administered on 8/29/17at 11:42;  Start 8/26/17 at 07:00;  Stop 9/25/17 at 06:59


Al Hydrox/Mg Hydrox/Simethicone (Mylanta) 30 ml Q4HP  PRN PO HEARTBURN/

INDIGESTION;  Start 8/26/17 at 07:00;  Stop 9/25/17 at 06:59


Divalproex Sodium (Depakote) 250 mg BID PO  Last administered on 8/29/17at 08:24

;  Start 8/28/17 at 09:00;  Stop 9/27/17 at 08:59


Home Med (Med Rec Complete!)  ASDIRECTED XX ;  Start 8/26/17 at 06:30;  Stop 8/ 26/17 at 06:30;  Status DC


Hydralazine HCl (Apresoline) 10 mg TID PO  Last administered on 8/26/17at 16:36

;  Start 8/26/17 at 16:00;  Stop 8/26/17 at 16:41;  Status DC


Hydralazine HCl (Apresoline) 25 mg TID PO  Last administered on 8/29/17at 08:25

;  Start 8/26/17 at 21:00;  Stop 9/25/17 at 20:59


Hydroxyzine HCl (Atarax) 50 mg Q4HP  PRN PO ITCHING Last administered on 8/29/ 17at 11:42;  Start 8/26/17 at 16:00;  Stop 9/25/17 at 15:59


Labetalol HCl (Normodyne, Trandate) 100 mg BID PO  Last administered on 8/29/ 17at 08:25;  Start 8/26/17 at 21:00;  Stop 9/25/17 at 20:59


Levothyroxine Sodium (Synthroid) 200 mcg DAILY@0600 PO  Last administered on 8/ 29/17at 06:11;  Start 8/26/17 at 14:00;  Stop 9/25/17 at 13:59


Magnesium Hydroxide (Milk Of Magnesia) 30 ml DAILYPRN  PRN PO CONSTIPATION;  

Start 8/26/17 at 07:00;  Stop 9/25/17 at 06:59


Risperidone (RisperDAL) 0.5 mg BID PO  Last administered on 8/29/17at 08:24;  

Start 8/26/17 at 21:00;  Stop 9/25/17 at 20:59


Sertraline HCl (Zoloft) 50 mg DAILY PO  Last administered on 8/26/17at 16:36;  

Start 8/26/17 at 09:00;  Stop 8/26/17 at 23:59;  Status DC


Sertraline HCl (Zoloft) 100 mg DAILY PO  Last administered on 8/29/17at 08:24;  

Start 8/27/17 at 09:00;  Stop 9/26/17 at 08:59





Allergies


Coded Allergies:  


     Prednisone (Unverified  Allergy, Unknown, aphylaxis, 12/26/15)


     Tramadol (Unverified  Allergy, Unknown, aphylaxis, 12/26/15)


     Trazodone (Verified  Allergy, Unknown, 3/18/15)











Elisa Pace Aug 29, 2017 13:04

## 2017-08-30 VITALS — DIASTOLIC BLOOD PRESSURE: 97 MMHG | SYSTOLIC BLOOD PRESSURE: 135 MMHG

## 2017-08-30 VITALS — DIASTOLIC BLOOD PRESSURE: 93 MMHG | SYSTOLIC BLOOD PRESSURE: 150 MMHG

## 2017-08-30 RX ADMIN — HYDRALAZINE HYDROCHLORIDE SCH MG: 25 TABLET, FILM COATED ORAL at 08:54

## 2017-08-30 RX ADMIN — DIVALPROEX SODIUM SCH MG: 250 TABLET, DELAYED RELEASE ORAL at 21:04

## 2017-08-30 RX ADMIN — LEVOTHYROXINE SODIUM SCH MCG: 100 TABLET ORAL at 05:47

## 2017-08-30 RX ADMIN — HYDRALAZINE HYDROCHLORIDE SCH MG: 25 TABLET, FILM COATED ORAL at 21:04

## 2017-08-30 RX ADMIN — LABETALOL HYDROCHLORIDE SCH MG: 100 TABLET, FILM COATED ORAL at 08:54

## 2017-08-30 RX ADMIN — HYDRALAZINE HYDROCHLORIDE SCH MG: 25 TABLET, FILM COATED ORAL at 16:11

## 2017-08-30 RX ADMIN — LABETALOL HYDROCHLORIDE SCH MG: 100 TABLET, FILM COATED ORAL at 21:04

## 2017-08-30 RX ADMIN — RISPERIDONE SCH MG: 0.5 TABLET ORAL at 08:53

## 2017-08-30 RX ADMIN — SERTRALINE HYDROCHLORIDE SCH MG: 100 TABLET ORAL at 08:53

## 2017-08-30 RX ADMIN — DIVALPROEX SODIUM SCH MG: 250 TABLET, DELAYED RELEASE ORAL at 08:54

## 2017-08-30 NOTE — MHIPNPDOC
[facility] Progress Note


Progress Note


DATE OF SERVICE: 8/30/17





HISTORY: day 5 of admission for SI and worsening depression due to financial 

stressors.





VITAL SIGNS: See below.





NEW TEST RESULTS: na





CURRENT MEDICATIONS: See below.





MENTAL STATUS EXAMINATION:


Patient is a 46-year old male, who is clean shaven, makes good eye contact, 

dark hair, glasses and short in stature.


Speech: Is spontaneous


Language skills are adequate


Thought processes including: goal directed.


Thought content: appropriate. Abstract reasoning, and computation: fair. 

Description of associations: good.


Description of abnormal or psychotic thoughts: pt denies persistent thoughts of 

suicide. No psychotic symptoms illicited.


Judgment: fair


Insight:  limited.


Orientation: well oriented to place, time, surroundings and person.


Recent and remote memory: adequate.


Attention span and concentration: improving as anxiety is reduced. h/o ADHD 


Fund of knowledge: Limited/Impaired, poor decision making.


Mood: euthymic. Affect: congruent.





DIAGNOSES:


1. Major depressive disorder, recurrent, severe


2. Rule out bipolar 2 disorder


3. Moderate intellectual disability


4. ADHD by history


 


ASSESSMENT:pt continues to be stressed that he has over extended himself 

financially. we are awaiting contact with the sister so we can plan a meeting. 

pt is doing much better in terms of mood and not falling apart emotionally. He 

is eating and sleeping well. No problems with elimination. he shaved and looks 

much better. He is able to smile. He states he has been in contact with the 

sister who says she will call today as she is not working. We had hoped to have 

his meeting with family today so he could be discharged but so far no contact 

with sister. Pt has been encouraged to contact her again about calling [facility].





MANAGEMENT PLAN: continue medications, monitor sleep and safety on close obs. 

Enc milieu and group therapy. Once pt is satisfied that supports are in place 

to help him sort out his financial state he can be discharged. He is currently 

not suicidal. pt should consider having a payee and also getting one for his 

son. He is to follow up at [facility] for med mgt and therapy.





TIME SPENT: 25 minutes.





Vital Signs





Vital Signs








  Date Time  Temp Pulse Resp B/P (MAP) Pulse Ox O2 Delivery O2 Flow Rate FiO2


 


8/30/17 08:54    135/97    


 


8/30/17 08:54  90      


 


8/30/17 06:40 97.6  18     


 


8/28/17 06:35      Room Air  


 


8/26/17 12:38     99   











Current Medications





Current Medications


Acetaminophen (Tylenol Tab) 650 mg Q6HP  PRN PO HEADACHE or DISCOMFORT Last 

administered on 8/29/17at 11:42;  Start 8/26/17 at 07:00;  Stop 9/25/17 at 06:59


Al Hydrox/Mg Hydrox/Simethicone (Mylanta) 30 ml Q4HP  PRN PO HEARTBURN/

INDIGESTION;  Start 8/26/17 at 07:00;  Stop 9/25/17 at 06:59


Divalproex Sodium (Depakote) 250 mg BID PO  Last administered on 8/30/17at 08:54

;  Start 8/28/17 at 09:00;  Stop 9/27/17 at 08:59


Home Med (Med Rec Complete!)  ASDIRECTED XX ;  Start 8/26/17 at 06:30;  Stop 8/ 26/17 at 06:30;  Status DC


Hydralazine HCl (Apresoline) 10 mg TID PO  Last administered on 8/26/17at 16:36

;  Start 8/26/17 at 16:00;  Stop 8/26/17 at 16:41;  Status DC


Hydralazine HCl (Apresoline) 25 mg TID PO  Last administered on 8/30/17at 08:54

;  Start 8/26/17 at 21:00;  Stop 9/25/17 at 20:59


Hydroxyzine HCl (Atarax) 50 mg Q4HP  PRN PO ANXIETY Last administered on 8/30/ 17at 02:00;  Start 8/26/17 at 16:00;  Stop 9/25/17 at 15:59


Labetalol HCl (Normodyne, Trandate) 100 mg BID PO  Last administered on 8/30/ 17at 08:54;  Start 8/26/17 at 21:00;  Stop 9/25/17 at 20:59


Levothyroxine Sodium (Synthroid) 200 mcg DAILY@0600 PO  Last administered on 8/ 30/17at 05:47;  Start 8/26/17 at 14:00;  Stop 9/25/17 at 13:59


Magnesium Hydroxide (Milk Of Magnesia) 30 ml DAILYPRN  PRN PO CONSTIPATION;  

Start 8/26/17 at 07:00;  Stop 9/25/17 at 06:59


Risperidone (RisperDAL) 0.5 mg BID PO  Last administered on 8/30/17at 08:53;  

Start 8/26/17 at 21:00;  Stop 9/25/17 at 20:59


Sertraline HCl (Zoloft) 50 mg DAILY PO  Last administered on 8/26/17at 16:36;  

Start 8/26/17 at 09:00;  Stop 8/26/17 at 23:59;  Status DC


Sertraline HCl (Zoloft) 100 mg DAILY PO  Last administered on 8/30/17at 08:53;  

Start 8/27/17 at 09:00;  Stop 9/26/17 at 08:59





Allergies


Coded Allergies:  


     Prednisone (Unverified  Allergy, Unknown, aphylaxis, 12/26/15)


     Tramadol (Unverified  Allergy, Unknown, aphylaxis, 12/26/15)


     Trazodone (Verified  Allergy, Unknown, 3/18/15)











Elisa Pace Aug 30, 2017 12:01

## 2017-08-31 VITALS — DIASTOLIC BLOOD PRESSURE: 90 MMHG | SYSTOLIC BLOOD PRESSURE: 142 MMHG

## 2017-08-31 VITALS — SYSTOLIC BLOOD PRESSURE: 142 MMHG | DIASTOLIC BLOOD PRESSURE: 90 MMHG

## 2017-08-31 RX ADMIN — ACETAMINOPHEN PRN MG: 325 TABLET ORAL at 03:45

## 2017-08-31 RX ADMIN — SERTRALINE HYDROCHLORIDE SCH MG: 100 TABLET ORAL at 08:05

## 2017-08-31 RX ADMIN — DIVALPROEX SODIUM SCH MG: 250 TABLET, DELAYED RELEASE ORAL at 08:05

## 2017-08-31 RX ADMIN — LEVOTHYROXINE SODIUM SCH MCG: 100 TABLET ORAL at 06:05

## 2017-08-31 RX ADMIN — HYDRALAZINE HYDROCHLORIDE SCH MG: 25 TABLET, FILM COATED ORAL at 08:05

## 2017-08-31 RX ADMIN — LABETALOL HYDROCHLORIDE SCH MG: 100 TABLET, FILM COATED ORAL at 08:04

## 2017-08-31 NOTE — MHDSPDOC
[facility] Discharge Summary


Discharge Summary


DATE OF ADMISSION: Aug 26, 2017 at 06:57 


DATE OF DISCHARGE: Aug 31, 2017 at 14:45





DISCHARGE DIAGNOSES:


1. Major depression with anxiety, recurrent, moderate.








REASON FOR ADMISSION: Depression & SI with plan





From H&P:


"CHIEF COMPLAINT: Patient came to the ED saying he wanted to OD on all his 

medications because he is at "the end of his rope" since he has financial 

problems.


 


HISTORY OF THE PRESENT ILLNESS: Patient is a 46-year-old male, who reports he 

has financial problems due overspending. He states he has about $6,000.00 in 

debts and he is not employed, he receives SSI and repeats over and over again," 

I don't know why I do this." He feels overwhelmed now that he is getting calls 

demanding money. He and his son live together and share expenses. His son is 

also on disability for Mild intellectual functioning, bipolar disorder and 

ADHD. He is very concerned about his wife's health who has lung cancer.


 


PSYCHIATRIC REVIEW OF SYSTEMS:


Affective: Hopeless, helpless, frustrated


Anxiety: Very high


Trauma: Denies


Psychosis: He's not responding to internal stimuli, denies psychosis


Personally: Needs further assessment





PAST PSYCHIATRIC HISTORY:


Prior Psychiatric Disorder: Patient says he has been taking Zoloft, and his 

doctor upgraded to 200 mgs. PO QD, but has not been compliant with it.


Outpatient Treatment: Gets medication from his family Doctor, Dr. Danny Figueroa. 

He used to go to [facility]. He doesn't remember what 

medications he was on. He started feeling well and then, he stopped taking them


Suicidal/Self injurious: He has suicidal ideation


Psychotropic Medication History: He has taken other psychiatric medications 

whose name he doesn't remember but he knows he takes Zoloft. He "thinks" he 

took Ritalin years ago for ADHD. 





ALLERGIES: Please see below.





FAMILY PSYCHIATRIC HISTORY: He has a 23 year son who has been diagnosed with 

Bipolar Disorder, ADHD and Mild Intellectual Disability. He is on disability 

for those reason. One of his brothers is "mentally handicapped, he's got a lot 

of emotional problems and stuff". he says he doesn't see his brother very often

, he doesn't know his brother diagnosis and he doesn't know if he takes 

medications.       





SOCIAL HISTORY:


Early Relations/development: Grew up in [place], his parents were from [place] 

[place]. He says he had problems learning, had 'a bad behavior problem", he says 

he had angry outbursts. He says he got school suspensions frequently but when 

he was 14, he never went back. He can't remember if he was expulsed from school 

or he was suspended and never went back.


Sibling order: He has two older brothers and one older sister. He is the fourth 

one. He has 4 younger siblings. One of them is , he was older than the 

patient. His  brother  of a massive heart attack at the age of 40.


Paternal relationships: Both parents are . "They  of medical stuff 

complications"


Education: He didn't graduate from High School. He has a hard time with reading 

and writing since he was a child. He has been diagnosed with a learning 

disability. He only can write his name, he can copy a few things but other than 

that, he's limited. He can't read a lot, he can't understand a lot.


Occupational: Unemployed. 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


Legal: Denies


Martial: He  is  but he is still . He helps to take care of his 

wife, who has lung cancer and has breathing difficulties.He has two sons and a 

daughter. Sons are 24 and 23. Daughter is 28 years old.


Economic: Receives disability check because he has a learning disability, he can

't read or write. He has spinal stenosis.


Supports: He feels his son is supportive, he feels his siblings are supportive.


Abuse/trauma: Denies


 


SUBSTANCE ABUSE HISTORY: Has used alcohol occasionally although he says years 

ago he used to drink "quite a bit but not anymore". doesn't smoke cigarettes. 

he used marijuana years ago, on and off but not anymore. Denies using cocaine 

or crack. Denies using heroine or other opioids.





PAST MEDICAL/SURGICAL HISTORY:


1. Spinal stenosis


2. Hypertension


3. Hypothyroidism"





CONSULTANTS INVOLVED: Medical evaluation and treatment





TREATMENT AND PROGRESS ON THE UNIT : The patient was admitted to inpatient unit 

and started on medications. Initially he was discharged and continued to be 

having suicidal thoughts, but remained compliant with the medications and 

responded well to the treatment. Patient's suicidal ideations faded away and he 

was participating in the unit activities, including group therapy and milieu 

treatment. Patient did not need any constant observation IM when necessary 

medications or restraints while being in the hospital





DISCHARGE ASSESSMENT:, Patient denied any suicidal or homicidal ideations, also 

denied hallucinations or paranoid ideations





MENTAL STATUS EXAMINATION ON DISCHARGE: 


45yo male sitting in the chair, looks appropriate for the stated age, fair 

hygiene and grooming, normal psychomotor activities, no abnormal movements, 

cooperative with fair eye contact, speech is normal in rate, rhythm, amount and 

prosody, mood is 'fine', affect full and mood congruent, thought process is 

logical and goal directed, denies suicidal and homicidal ideations, denies 

hallucinations, no delusions elicited, aaox3, fair immediate, short term and 

long term memory, fair insight, judgement and impulse control








MEDICATIONS ON DISCHARGE:


As noted below 





PLAN/FOLLOWUP ARRANGEMENTS: As arranged and noted in discharge planning as 

noted.





The amount of time spent in the coordination of care for this patient was 

approximately 30 minutes.





Vital Signs/I&Os





Vital Signs








  Date Time  Temp Pulse Resp B/P (MAP) Pulse Ox O2 Delivery O2 Flow Rate FiO2


 


17 08:05    142/90    


 


17 08:04  80      


 


17 06:43 97.0  18     


 


17 06:35      Room Air  


 


17 12:38     99   











Medications


Scheduled


Hydralazine HCl (Hydralazine HCl) 10 Mg Tab, 10 MG PO TID, (Reported)


Labetalol HCl (Labetalol HCl) 100 Mg Tab, 100 MG PO BID, (Reported)


Levothyroxine Sodium (Synthroid) 200 Mcg Tab, 200 MCG PO DAILY, (Reported)


Risperidone (Risperdal) 0.5 Mg Tab, 0.5 MG PO BID for ANXIETY/AGITATION for 7 

Days, #14


Sertraline HCl (Sertraline HCl) 100 Mg Tab, 100 MG PO QHS, (Reported)


[Sertraline Hcl] 100 MG TAB, 100 MG PO DAILY for DEPRESSION for 7 Days, #7


   do not run out of medication or stop it abruptly. medication must be tapered 

before stopping. 





Scheduled PRN


Hydroxyzine HCl (Hydroxyzine HCl) 50 Mg Tab, 50 MG PO Q4HP PRN for ANXIETY for 

7 Days, #42


   only take when necessary for anxiety 





Allergies


Coded Allergies:  


     Prednisone (Unverified  Allergy, Unknown, aphylaxis, 12/26/15)


     Tramadol (Unverified  Allergy, Unknown, aphylaxis, 12/26/15)


     Trazodone (Verified  Allergy, Unknown, 3/18/15)











MOHAMUD CORDERO MD Aug 31, 2017 19:24

## 2018-01-24 ENCOUNTER — HOSPITAL ENCOUNTER (EMERGENCY)
Dept: HOSPITAL 53 - M ED | Age: 48
Discharge: HOME | End: 2018-01-24
Payer: COMMERCIAL

## 2018-01-24 DIAGNOSIS — R19.5: ICD-10-CM

## 2018-01-24 DIAGNOSIS — M77.32: ICD-10-CM
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
## 2018-01-24 DIAGNOSIS — Z88.8: ICD-10-CM

## 2018-01-24 DIAGNOSIS — Z79.899: ICD-10-CM

## 2018-01-24 DIAGNOSIS — S91.332A: Primary | ICD-10-CM

## 2018-01-24 DIAGNOSIS — Z88.5: ICD-10-CM

## 2018-01-24 DIAGNOSIS — D64.89: ICD-10-CM
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
## 2018-01-24 LAB
ALBUMIN/GLOBULIN RATIO: 1.36 (ref 1–1.93)
ALBUMIN: 3.4 GM/DL (ref 3.2–5.2)
ALKALINE PHOSPHATASE: 84 U/L (ref 45–117)
ALT SERPL W P-5'-P-CCNC: 17 U/L (ref 12–78)
ANION GAP: 4 MEQ/L (ref 8–16)
AST SERPL-CCNC: 17 U/L (ref 7–37)
BASO #: 0.1 10^3/UL (ref 0–0.2)
BASO %: 0.9 % (ref 0–1)
BILIRUB CONJ SERPL-MCNC: 0.1 MG/DL (ref 0–0.2)
BILIRUBIN,TOTAL: 0.3 MG/DL (ref 0.2–1)
BLOOD UREA NITROGEN: 18 MG/DL (ref 7–18)
CALCIUM LEVEL: 8.1 MG/DL (ref 8.5–10.1)
CARBON DIOXIDE LEVEL: 29 MEQ/L (ref 21–32)
CHLORIDE LEVEL: 111 MEQ/L (ref 98–107)
CREATININE FOR GFR: 0.91 MG/DL (ref 0.7–1.3)
EOS #: 0.4 10^3/UL (ref 0–0.5)
EOSINOPHIL NFR BLD AUTO: 5 % (ref 0–3)
GFR SERPL CREATININE-BSD FRML MDRD: > 60 ML/MIN/{1.73_M2} (ref 60–?)
GLUCOSE, FASTING: 79 MG/DL (ref 70–100)
HEMATOCRIT: 29.2 % (ref 42–52)
HEMOGLOBIN: 9.1 G/DL (ref 14–18)
IMMATURE GRANULOCYTE #: 0 10^3/UL (ref 0–0)
IMMATURE GRANULOCYTE %: 0.3 % (ref 0–0)
INR: 1.03
LYMPH #: 1.6 10^3/UL (ref 1.5–4.5)
LYMPH %: 23 % (ref 24–44)
MEAN CORPUSCULAR HEMOGLOBIN: 27.6 PG (ref 27–33)
MEAN CORPUSCULAR HGB CONC: 31.2 G/DL (ref 32–36.5)
MEAN CORPUSCULAR VOLUME: 88.5 FL (ref 80–96)
MONO #: 0.8 10^3/UL (ref 0–0.8)
MONO %: 10.7 % (ref 0–5)
NEUTROPHILS #: 4.2 10^3/UL (ref 1.8–7.7)
NEUTROPHILS %: 60.1 % (ref 36–66)
NRBC BLD AUTO-RTO: 0 % (ref 0–0)
PARTIAL THROMBOPLASTIN TIME: 28.4 SECONDS (ref 26.8–37.9)
PLATELET COUNT, AUTOMATED: 223 10^3/UL (ref 150–450)
POTASSIUM SERUM: 4.3 MEQ/L (ref 3.5–5.1)
PROTHROMBIN TIME: 13.6 SECONDS (ref 12.4–14.5)
RED BLOOD COUNT: 3.3 10^6/UL (ref 4.3–6.1)
RED CELL DISTRIBUTION WIDTH: 14.8 % (ref 11.5–14.5)
SODIUM LEVEL: 144 MEQ/L (ref 136–145)
TOTAL PROTEIN: 5.9 GM/DL (ref 6.4–8.2)
WHITE BLOOD COUNT: 7 10^3/UL (ref 4–10)

## 2018-01-24 RX ADMIN — SODIUM CHLORIDE 1 MLS/HR: 9 INJECTION, SOLUTION INTRAVENOUS at 21:30

## 2018-01-24 RX ADMIN — AMOXICILLIN AND CLAVULANATE POTASSIUM 1 MG: 875; 125 TABLET, FILM COATED ORAL at 20:03

## 2018-01-24 RX ADMIN — DEXTROSE MONOHYDRATE 1 MG: 50 INJECTION, SOLUTION INTRAVENOUS at 21:30

## 2018-11-16 ENCOUNTER — HOSPITAL ENCOUNTER (EMERGENCY)
Dept: HOSPITAL 53 - M ED | Age: 48
LOS: 1 days | Discharge: HOME | End: 2018-11-17
Payer: MEDICAID

## 2018-11-16 DIAGNOSIS — Z79.899: ICD-10-CM

## 2018-11-16 DIAGNOSIS — F41.9: ICD-10-CM

## 2018-11-16 DIAGNOSIS — F33.9: ICD-10-CM

## 2018-11-16 DIAGNOSIS — K40.20: Primary | ICD-10-CM

## 2018-11-16 DIAGNOSIS — Z88.5: ICD-10-CM

## 2018-11-16 DIAGNOSIS — M48.00: ICD-10-CM

## 2018-11-16 DIAGNOSIS — E03.9: ICD-10-CM

## 2018-11-16 DIAGNOSIS — Z98.84: ICD-10-CM

## 2018-11-16 DIAGNOSIS — I10: ICD-10-CM

## 2018-11-16 DIAGNOSIS — Z79.890: ICD-10-CM

## 2018-11-16 DIAGNOSIS — Z88.8: ICD-10-CM

## 2018-11-16 LAB
ANION GAP: 7 MEQ/L (ref 8–16)
BASO #: 0.1 10^3/UL (ref 0–0.2)
BASO %: 0.8 % (ref 0–1)
BLOOD UREA NITROGEN: 19 MG/DL (ref 7–18)
CALCIUM LEVEL: 8.7 MG/DL (ref 8.5–10.1)
CARBON DIOXIDE LEVEL: 25 MEQ/L (ref 21–32)
CHLORIDE LEVEL: 109 MEQ/L (ref 98–107)
CREATININE FOR GFR: 1.08 MG/DL (ref 0.7–1.3)
EOS #: 0.3 10^3/UL (ref 0–0.5)
EOSINOPHIL NFR BLD AUTO: 3.7 % (ref 0–3)
GFR SERPL CREATININE-BSD FRML MDRD: > 60 ML/MIN/{1.73_M2} (ref 56–?)
GLUCOSE, FASTING: 95 MG/DL (ref 70–100)
HEMATOCRIT: 42.4 % (ref 42–52)
HEMOGLOBIN: 14.1 G/DL (ref 13.5–17.5)
IMMATURE GRANULOCYTE %: 0.5 % (ref 0–3)
LACTIC ACID SEPSIS PROTOCOL: 0.9 MMOL/L (ref 0.4–2)
LYMPH #: 2.2 10^3/UL (ref 1.5–4.5)
LYMPH %: 26.7 % (ref 24–44)
MEAN CORPUSCULAR HEMOGLOBIN: 27.7 PG (ref 27–33)
MEAN CORPUSCULAR HGB CONC: 33.3 G/DL (ref 32–36.5)
MEAN CORPUSCULAR VOLUME: 83.3 FL (ref 80–96)
MONO #: 0.7 10^3/UL (ref 0–0.8)
MONO %: 8.5 % (ref 0–5)
NEUTROPHILS #: 5 10^3/UL (ref 1.8–7.7)
NEUTROPHILS %: 59.8 % (ref 36–66)
NRBC BLD AUTO-RTO: 0 % (ref 0–0)
PLATELET COUNT, AUTOMATED: 233 10^3/UL (ref 150–450)
POTASSIUM SERUM: 4.1 MEQ/L (ref 3.5–5.1)
RED BLOOD COUNT: 5.09 10^6/UL (ref 4.3–6.1)
RED CELL DISTRIBUTION WIDTH: 15.4 % (ref 11.5–14.5)
SODIUM LEVEL: 141 MEQ/L (ref 136–145)
WHITE BLOOD COUNT: 8.4 10^3/UL (ref 4–10)

## 2018-11-16 RX ADMIN — KETOROLAC TROMETHAMINE 1 MG: 30 INJECTION, SOLUTION INTRAMUSCULAR at 23:18

## 2018-11-16 RX ADMIN — MORPHINE SULFATE 1 MG: 4 INJECTION INTRAVENOUS at 22:47

## 2024-01-01 NOTE — HPE
DATE OF ADMISSION:  2017

 

HISTORY OF PRESENT ILLNESS:  Please refer to psychiatric history and evaluation

for further details on this admission.  This examination and history is intended

for medical issues, which may need treatment, consult or followup on this

46-year-old male.

 

PRIMARY CARE PROVIDER:  ANT De Souza.

 

ALLERGIES:  PREDNISONE, TRAMADOL, TRAZODONE.

 

SOCIAL HISTORY:  He is .  He is currently receiving disability for

learning disability.  He lives with his son who is on disability for bipolar

disorder, attention deficit hyperactivity disorder (ADHD) and mild intellectual

disability.  Ethyl alcohol (EtOH):  Years ago he drank very heavily.  He states

now he drinks one or two drinks every 2-3 months.   Smokes none.  Recreational

drug use:  None.

 

PAST MEDICAL HISTORY:

1.  Hypothyroidism.

2.  Hypertension.

3.  Spinal stenosis.

4.  He has not been taking his medications he says for at least 3-4 months and

before that very sporadically.

 

PAST SURGICAL HISTORY:

1.  Gastric bypass.

2.  Back surgery about 20 years ago.

3.  Internal urethrotomy 2015 for a history of urethral stricture.

 

FAMILY HISTORY:  Mother's medical history is unknown by patient.  Patient's

father is .

 

HOME MEDICATIONS:

-  sertraline 100 mg by mouth nightly

-  hydralazine 10 mg by mouth three times a day

-  labetalol 100 mg by mouth twice a day

-  levothyroxine 200 mcg by mouth daily

 

LABORATORY STUDIES:  WBC 6.5, hemoglobin 14.5, hematocrit 43, platelets 275.

 

Sodium 143, potassium 3.4 and patient had replacement, total bilirubin 2.0,

direct bilirubin 0.3, TSH was 89.900, free T4 was 0.79.

 

Toxicology was negative.

REVIEW OF SYSTEMS:  10-system review was done.  He had no physical complaints.

It was unremarkable.

 

Patient had a medical consult while in the emergency room prior to arrival to the

inpatient mental health unit done by Dr. Basurto for his elevated hypothyroidism

and blood pressure.  See consult.

 

PHYSICAL EXAMINATION:

Height 69 inches, weight 88.71 kg, body mass index (BMI) 28.9.  Blood pressure

upon arrival to the unit was 171/121, half an hour later 188/120, pulse has been

57-69.  He received his labetalol and hydralazine and by 1612 blood pressure was

improving.  It was 138/96.  Pulse 69, respirations 18, temperature 97.8.

46-year-old cooperative male in no acute distress, wears glasses.

Pupils equal and react to light.  Extraocular muscles intact.  Cornea and sclerae

clear.  Conjunctivae were normal.  No facial asymmetry.  Pharynx, tongue and gums

pink and moist.  Tongue is midline.

Neck is supple without lymphadenopathy.  No thyromegaly, no goiter.  Carotids 2+

without bruit.

Chest clear to auscultation without wheeze or retraction.

Heart is regular.

Abdomen is benign.  Bowel sounds positive.

Genitourinary/rectal:  Not done.

Extremities:  Show equal strength, full range of motion.  No cyanosis, clubbing

or edema.  Peripheral pulses equal and palpable bilaterally.

Skin is warm and dry.

 

IMPRESSION/PLAN:

1.  Psychiatric plan per psychiatry.

 

2.  Uncontrolled hypertension secondary to medication noncompliance.  Improved

since restarting his labetalol and hydralazine.  Will continue to monitor and

adjust as needed.  Followup with primary care provider Danny Carrera.

 

3.  Elevated thyroid-stimulating hormone (TSH).  Again, secondary to medication

noncompliance.  Levothyroxine has been restarted.  He will need to followup with

his primary care provider as an outpatient and have a recheck TSH, T4 in

approximately 6 weeks.

 

4.  Hypokalemia.  Patient was given replacement potassium in the emergency room.

Will recheck a level in the morning.
1